# Patient Record
Sex: FEMALE | URBAN - METROPOLITAN AREA
[De-identification: names, ages, dates, MRNs, and addresses within clinical notes are randomized per-mention and may not be internally consistent; named-entity substitution may affect disease eponyms.]

---

## 2019-03-15 ENCOUNTER — APPOINTMENT (RX ONLY)
Dept: URBAN - METROPOLITAN AREA CLINIC 18 | Facility: CLINIC | Age: 69
Setting detail: DERMATOLOGY
End: 2019-03-15

## 2019-03-15 PROBLEM — Z41.8 ENCOUNTER FOR OTHER PROCEDURES FOR PURPOSES OTHER THAN REMEDYING HEALTH STATE: Status: ACTIVE | Noted: 2019-03-15

## 2019-03-15 PROCEDURE — ? TREATMENT REGIMEN

## 2019-03-15 NOTE — PROCEDURE: TREATMENT REGIMEN
Initiate Treatment: Cosmetic consultation on brown spots and deep wrinkles
Plan: For brown spots on the sides of face and right side of forehead I recommended dye-vl laser treatment 2-3 sessions 6 weeks apart for $272/treatment. For deep wrinkles located around the mouth and forehead I recommended CO2 resurfacing laser with Dr. Shaikh. CO2 treatment requires another consultation when client will be provided with the prescriptions.
Detail Level: Zone